# Patient Record
Sex: FEMALE | Race: WHITE | Employment: UNEMPLOYED | URBAN - METROPOLITAN AREA
[De-identification: names, ages, dates, MRNs, and addresses within clinical notes are randomized per-mention and may not be internally consistent; named-entity substitution may affect disease eponyms.]

---

## 2018-02-28 ENCOUNTER — OFFICE VISIT (OUTPATIENT)
Dept: INTERNAL MEDICINE CLINIC | Age: 48
End: 2018-02-28

## 2018-02-28 VITALS
HEIGHT: 65 IN | TEMPERATURE: 99.2 F | DIASTOLIC BLOOD PRESSURE: 83 MMHG | OXYGEN SATURATION: 98 % | RESPIRATION RATE: 18 BRPM | BODY MASS INDEX: 30.16 KG/M2 | SYSTOLIC BLOOD PRESSURE: 110 MMHG | HEART RATE: 96 BPM | WEIGHT: 181 LBS

## 2018-02-28 DIAGNOSIS — Z79.4 TYPE 2 DIABETES MELLITUS WITHOUT COMPLICATION, WITH LONG-TERM CURRENT USE OF INSULIN (HCC): Primary | ICD-10-CM

## 2018-02-28 DIAGNOSIS — E11.9 TYPE 2 DIABETES MELLITUS WITHOUT COMPLICATION, WITH LONG-TERM CURRENT USE OF INSULIN (HCC): Primary | ICD-10-CM

## 2018-02-28 DIAGNOSIS — I47.1 PAROXYSMAL SVT (SUPRAVENTRICULAR TACHYCARDIA) (HCC): ICD-10-CM

## 2018-02-28 DIAGNOSIS — Z92.0 HISTORY OF USE OF CONTRACEPTIVE INTRAUTERINE DEVICE (IUD): ICD-10-CM

## 2018-02-28 DIAGNOSIS — R45.86 MOOD CHANGES: ICD-10-CM

## 2018-02-28 PROBLEM — F19.21 DRUG ADDICTION IN REMISSION (HCC): Status: ACTIVE | Noted: 2018-02-28

## 2018-02-28 RX ORDER — ATORVASTATIN CALCIUM 20 MG/1
TABLET, FILM COATED ORAL
Refills: 3 | COMMUNITY
Start: 2018-02-05

## 2018-02-28 RX ORDER — LOSARTAN POTASSIUM AND HYDROCHLOROTHIAZIDE 12.5; 1 MG/1; MG/1
TABLET ORAL
Refills: 3 | COMMUNITY
Start: 2018-02-05 | End: 2022-01-21 | Stop reason: ALTCHOICE

## 2018-02-28 RX ORDER — METFORMIN HYDROCHLORIDE 500 MG/1
TABLET, EXTENDED RELEASE ORAL
Refills: 3 | COMMUNITY
Start: 2018-02-07

## 2018-02-28 RX ORDER — ARIPIPRAZOLE 15 MG/1
5 TABLET ORAL
Refills: 2 | COMMUNITY
Start: 2018-02-06 | End: 2022-01-21 | Stop reason: ALTCHOICE

## 2018-02-28 RX ORDER — INSULIN HUMAN 100 [IU]/ML
30 INJECTION, SUSPENSION SUBCUTANEOUS
COMMUNITY
Start: 2018-02-12 | End: 2022-01-21 | Stop reason: ALTCHOICE

## 2018-02-28 NOTE — MR AVS SNAPSHOT
[address] [place] [address] [address] [place] [address] 
[phone] Patient: Julito Cedeño MRN: [id number] Visit Information Date & Time Provider Department Dept. Phone Encounter #  
 2/28/2018 10:30 AM Robert Browne MD [facility] [phone] [id number] Upcoming Health Maintenance Date Due Pneumococcal 19-64 Medium Risk (1 of 1 - PPSV23) 6/11/1987 PAP AKA CERVICAL CYTOLOGY 6/11/1989 DTaP/Tdap/Td series (1 - Tdap) 5/9/2011 Influenza Age 5 to Adult 8/1/2017 Allergies as of 2/28/2018  Review Complete On: 2/28/2018 By: Deondre Blanco LPN No Known Allergies Current Immunizations  Never Reviewed Name Date  
 TD Vaccine 5/8/2011  9:07 PM  
  
 Not reviewed this visit You Were Diagnosed With   
  
 Codes Comments Type 2 diabetes mellitus without complication, with long-term current use of insulin (HCC)    -  Primary ICD-10-CM: E11.9, Z79.4 ICD-9-CM: 250.00, V58.67 Mood changes (HCC)     ICD-10-CM: F39 
ICD-9-CM: 296.90 Paroxysmal SVT (supraventricular tachycardia) (HCC)     ICD-10-CM: I47.1 ICD-9-CM: 427.0 History of use of contraceptive intrauterine device (IUD)     ICD-10-CM: Z92.0 ICD-9-CM: V15.7 Vitals BP Pulse Temp Resp Height(growth percentile) Weight(growth percentile) 110/83 96 99.2 °F (37.3 °C) (Oral) 18 5' 5\" (1.651 m) 181 lb (82.1 kg) LMP SpO2 BMI OB Status Smoking Status 02/26/2018 (Exact Date) 98% 30.12 kg/m2 Having regular periods Current Every Day Smoker Vitals History BMI and BSA Data Body Mass Index Body Surface Area  
 30.12 kg/m 2 1.94 m 2 Preferred Pharmacy Pharmacy Name Phone [id number] - [place], [place] - [address] [phone] Your Updated Medication List  
  
   
This list is accurate as of 2/28/18 11:11 AM.  Always use your most recent med list.  
  
  
  
  
 ARIPiprazole 15 mg tablet Commonly known as:  ABILIFY TAKE 1 TABLET BY ORAL ROUTE EVERY DAY  
  
 atorvastatin 20 mg tablet Commonly known as:  LIPITOR  
TAKE 1 TABLET BY ORAL ROUTE EVERY DAY HumuLIN 70/30 U-100 Insulin 100 unit/mL (70-30) injection Generic drug:  insulin NPH/insulin regular 30 Units Before breakfast and dinner. hydrOXYzine HCl 50 mg tablet Commonly known as:  ATARAX Take 50 mg by mouth three (3) times daily as needed. losartan-hydroCHLOROthiazide 100-12.5 mg per tablet Commonly known as:  HYZAAR  
TAKE 1 TABLET BY ORAL ROUTE EVERY DAY  
  
 metFORMIN  mg tablet Commonly known as:  GLUCOPHAGE XR  
TAKE 1 TABLET BY ORAL ROUTE 2 TIMES EVERY DAY WELLBUTRIN 100 mg tablet Generic drug:  buPROPion Take 300 mg by mouth daily. Indications: DEPRESSION We Performed the Following CBC WITH AUTOMATED DIFF [03997 CPT(R)] HEMOGLOBIN A1C W/O EAG [11797 CPT(R)] LIPID PANEL [59534 CPT(R)] METABOLIC PANEL, COMPREHENSIVE [18112 CPT(R)] MICROALBUMIN, UR, RAND W/ MICROALB/CREAT RATIO A3084517 CPT(R)] REFERRAL TO OBSTETRICS AND GYNECOLOGY [REF51 Custom] TSH 3RD GENERATION [75223 CPT(R)] Referral Information Referral ID Referred By Referred To  
  
 [id number] Daniel Hopkins MD   
   [address] [place] [address] Pkwy Phone: [phone] Fax: [phone] Visits Status Start Date End Date 1 New Request 2/28/18 2/28/19 If your referral has a status of pending review or denied, additional information will be sent to support the outcome of this decision. Introducing [facility]! Naheed Holt introduces Memorop patient portal. Now you can access parts of your medical record, email your doctor's office, and request medication refills online. 1. In your internet browser, go to [email or web address] 2. Click on the First Time User? Click Here link in the Sign In box. You will see the New Member Sign Up page. 3. Enter your Systems Integration Access Code exactly as it appears below. You will not need to use this code after youve completed the sign-up process. If you do not sign up before the expiration date, you must request a new code. · Systems Integration Access Code: [email or web address] Expires: 5/29/2018 11:11 AM 
 
4. Enter the last four digits of your Social Security Number (xxxx) and Date of Birth (mm/dd/yyyy) as indicated and click Submit. You will be taken to the next sign-up page. 5. Create a Systems Integration ID. This will be your Systems Integration login ID and cannot be changed, so think of one that is secure and easy to remember. 6. Create a Systems Integration password. You can change your password at any time. 7. Enter your Password Reset Question and Answer. This can be used at a later time if you forget your password. 8. Enter your e-mail address. You will receive e-mail notification when new information is available in [address] 9. Click Sign Up. You can now view and download portions of your medical record. 10. Click the Download Summary menu link to download a portable copy of your medical information. If you have questions, please visit the Frequently Asked Questions section of the Systems Integration website. Remember, Systems Integration is NOT to be used for urgent needs. For medical emergencies, dial 911. Now available from your iPhone and Android! Please provide this summary of care documentation to your next provider. Your primary care clinician is listed as Magen Tripp. If you have any questions after today's visit, please call [phone].

## 2018-02-28 NOTE — PROGRESS NOTES
Chief Complaint   Patient presents with    Establish Care     Hx of Tachycardia, anxiety, jansergiory [address] [place] [address]      Seen [address] [place] [address] no records  History of Present Illness: This is a difficult new patient. She is a 50year-old disabled women who is disabled for psychiatric disorder. She denies bipolar. She says she has had chronic depression, PTSD, panic attack and anxiety. She is a tobacco abuser. She has a history of substance abuse. She was previously addicted to pain medications. She has been addicted to cocaine in the past.  She has been addicted to heroin in the past.  She now uses marijuana. She does not drink alcohol. She is addicted to tobacco.  She is smoking one and a half packs a day. She is now an insulin dependent diabetic. She is being managed by [facility]. She was in the ER at [address] [place] [address] over a month ago with tachycardia. No records are available. They sent her home with an appointment to see cardiology. She still has not seen Dr. Drew Vaughan. She says she now has a referral from the [facility] doctor to see Dr. Drew Vaughan. She has not cut back on her tobacco.  She does drink two big cups of coffee every morning. She has chronic anxiety. She sees the nurse practitioner at [facility] every two weeks. They have got her on Abilify weaning off of Seroquel. She is a true mess. No exercise. She has an IUD that has not been checked in ten years. 4 pregnancies, five kids, the youngest is 8. She has no custody of any of them. Only records are er notes and old psych admission    Physical Examination:   She has poor dentition. Blood pressure was normal.  Chest was clear. She smelled of tobacco.  Her cardiac exam was regular rhythm, no murmur. Discussion/Plan:  I recommended cardiology followup. I told her to reduce her tobacco, reduce her caffeine and they may be causing the tachycardia. I told her to continue to manage her diabetes with [facility] as she is doing.   We could do it here, but she can walk to the [facility] [place]. She needs a ride to come here. I did not really make any other big recommendations other than she follow up with [facility] for psychiatric care. Follow up with [facility] for diabetes. Noted that her Abilify will make her diabetes worse. Substance abuse counseling was advised. Getting into programs to help with her substance abuse would be helpful. SUBJECTIVE: Emperatriz Contreras is a 50 y.o. female seen for a follow up visit; she has diabetes, hypertension and hyperlipidemia. Current Outpatient Prescriptions   Medication Sig Dispense Refill    atorvastatin (LIPITOR) 20 mg tablet TAKE 1 TABLET BY ORAL ROUTE EVERY DAY  3    losartan-hydroCHLOROthiazide (HYZAAR) 100-12.5 mg per tablet TAKE 1 TABLET BY ORAL ROUTE EVERY DAY  3    metFORMIN ER (GLUCOPHAGE XR) 500 mg tablet TAKE 1 TABLET BY ORAL ROUTE 2 TIMES EVERY DAY  3    HUMULIN 70/30 U-100 INSULIN 100 unit/mL (70-30) injection 30 Units Before breakfast and dinner.  ARIPiprazole (ABILIFY) 15 mg tablet TAKE 1 TABLET BY ORAL ROUTE EVERY DAY  2    buPROPion (WELLBUTRIN) 100 mg tablet Take 300 mg by mouth daily. Indications: DEPRESSION      hydrOXYzine (ATARAX) 50 mg tablet Take 50 mg by mouth three (3) times daily as needed. Patient Active Problem List   Diagnosis Code    Mood changes ([facility] F39    Type 2 diabetes mellitus without complication, with long-term current use of insulin ([facility] E11.9, Z79.4    Drug addiction in remission ([facility] F19.21    Alcoholism /alcohol abuse ([facility] F10.20     System Review: Cardiovascular ROS - taking medications as instructed, no medication side effects noted, patient does not perform home BP monitoring, no TIA's, no chest pain on exertion, no dyspnea on exertion, no swelling of ankles, palpitations described as racing. New concerns: .      OBJECTIVE:  Visit Vitals    /83    Pulse 96    Temp 99.2 °F (37.3 °C) (Oral)    Resp 18    Ht 5' 5\" (1.651 m)    Wt 181 lb (82.1 kg)    LMP 02/26/2018 (Exact Date)    SpO2 98%    BMI 30.12 kg/m2      Appearance: alert, well appearing, and in no distress and overweight. General exam: CVS exam BP noted to be well controlled today in office, S1, S2 normal, no gallop, no murmur, chest clear, no JVD, no HSM, no edema. Lab review: orders written for new lab studies as appropriate; see orders. ASSESSMENT:  diabetes poorly controlled, hypertension stable, hyperlipidemia control uncertain. PLAN:  current treatment plan is effective, no change in therapy  lab results and schedule of future lab studies reviewed with patient  recommended sodium restriction  very strongly urged to quit smoking to reduce cardiovascular risk  reviewed medications and side effects in detail  reviewed potential future medication changes and side effects  use of aspirin to prevent MI and TIA's discussed. Diagnoses and all orders for this visit:    1. Type 2 diabetes mellitus without complication, with long-term current use of insulin (HCC)  -     CBC WITH AUTOMATED DIFF  -     LIPID PANEL  -     METABOLIC PANEL, COMPREHENSIVE  -     HEMOGLOBIN A1C W/O EAG  -     MICROALBUMIN, UR, RAND W/ MICROALB/CREAT RATIO    2. Mood changes (HCC)    3. Paroxysmal SVT (supraventricular tachycardia) ([facility])  -     TSH 3RD GENERATION    4.  History of use of contraceptive intrauterine device (IUD)  -     [facility] ref [facility]

## 2019-12-30 LAB — EF %, EXTERNAL: NORMAL

## 2021-04-07 LAB — LDL-C, EXTERNAL: 76

## 2021-04-28 ENCOUNTER — HOSPITAL ENCOUNTER (EMERGENCY)
Age: 51
Discharge: HOME | End: 2021-04-28
Attending: EMERGENCY MEDICINE
Payer: MEDICARE

## 2021-04-28 VITALS
OXYGEN SATURATION: 100 % | HEART RATE: 85 BPM | BODY MASS INDEX: 27.66 KG/M2 | DIASTOLIC BLOOD PRESSURE: 72 MMHG | TEMPERATURE: 97.6 F | WEIGHT: 166 LBS | SYSTOLIC BLOOD PRESSURE: 123 MMHG | HEIGHT: 65 IN | RESPIRATION RATE: 21 BRPM

## 2021-04-28 DIAGNOSIS — E87.6 HYPOKALEMIA: ICD-10-CM

## 2021-04-28 DIAGNOSIS — F10.10 ALCOHOL ABUSE: Primary | ICD-10-CM

## 2021-04-28 DIAGNOSIS — R11.2 NON-INTRACTABLE VOMITING WITH NAUSEA, UNSPECIFIED VOMITING TYPE: ICD-10-CM

## 2021-04-28 LAB
ANION GAP SERPL CALC-SCNC: 9 MMOL/L (ref 5–15)
BASOPHILS # BLD: 0 K/UL (ref 0–0.1)
BASOPHILS NFR BLD: 1 % (ref 0–1)
BUN SERPL-MCNC: 8 MG/DL (ref 6–20)
BUN/CREAT SERPL: 7 (ref 12–20)
CALCIUM SERPL-MCNC: 9.2 MG/DL (ref 8.5–10.1)
CHLORIDE SERPL-SCNC: 111 MMOL/L (ref 97–108)
CO2 SERPL-SCNC: 20 MMOL/L (ref 21–32)
COMMENT, HOLDF: NORMAL
CREAT SERPL-MCNC: 1.12 MG/DL (ref 0.55–1.02)
DIFFERENTIAL METHOD BLD: NORMAL
EOSINOPHIL # BLD: 0 K/UL (ref 0–0.4)
EOSINOPHIL NFR BLD: 1 % (ref 0–7)
ERYTHROCYTE [DISTWIDTH] IN BLOOD BY AUTOMATED COUNT: 12.3 % (ref 11.5–14.5)
GLUCOSE SERPL-MCNC: 215 MG/DL (ref 65–100)
HCT VFR BLD AUTO: 43.4 % (ref 35–47)
HGB BLD-MCNC: 15.3 G/DL (ref 11.5–16)
IMM GRANULOCYTES # BLD AUTO: 0 K/UL (ref 0–0.04)
IMM GRANULOCYTES NFR BLD AUTO: 0 % (ref 0–0.5)
LIPASE SERPL-CCNC: 90 U/L (ref 73–393)
LYMPHOCYTES # BLD: 2.3 K/UL (ref 0.8–3.5)
LYMPHOCYTES NFR BLD: 39 % (ref 12–49)
MCH RBC QN AUTO: 33.3 PG (ref 26–34)
MCHC RBC AUTO-ENTMCNC: 35.3 G/DL (ref 30–36.5)
MCV RBC AUTO: 94.3 FL (ref 80–99)
MONOCYTES # BLD: 0.5 K/UL (ref 0–1)
MONOCYTES NFR BLD: 9 % (ref 5–13)
NEUTS SEG # BLD: 3.1 K/UL (ref 1.8–8)
NEUTS SEG NFR BLD: 50 % (ref 32–75)
NRBC # BLD: 0 K/UL (ref 0–0.01)
NRBC BLD-RTO: 0 PER 100 WBC
PLATELET # BLD AUTO: 239 K/UL (ref 150–400)
PMV BLD AUTO: 9.8 FL (ref 8.9–12.9)
POTASSIUM SERPL-SCNC: 3.4 MMOL/L (ref 3.5–5.1)
RBC # BLD AUTO: 4.6 M/UL (ref 3.8–5.2)
SAMPLES BEING HELD,HOLD: NORMAL
SODIUM SERPL-SCNC: 140 MMOL/L (ref 136–145)
WBC # BLD AUTO: 6 K/UL (ref 3.6–11)

## 2021-04-28 PROCEDURE — 83690 ASSAY OF LIPASE: CPT

## 2021-04-28 PROCEDURE — 74011250636 HC RX REV CODE- 250/636: Performed by: EMERGENCY MEDICINE

## 2021-04-28 PROCEDURE — 80048 BASIC METABOLIC PNL TOTAL CA: CPT

## 2021-04-28 PROCEDURE — 99285 EMERGENCY DEPT VISIT HI MDM: CPT

## 2021-04-28 PROCEDURE — 85025 COMPLETE CBC W/AUTO DIFF WBC: CPT

## 2021-04-28 PROCEDURE — 96376 TX/PRO/DX INJ SAME DRUG ADON: CPT

## 2021-04-28 PROCEDURE — 96361 HYDRATE IV INFUSION ADD-ON: CPT

## 2021-04-28 PROCEDURE — 96374 THER/PROPH/DIAG INJ IV PUSH: CPT

## 2021-04-28 PROCEDURE — 36415 COLL VENOUS BLD VENIPUNCTURE: CPT

## 2021-04-28 RX ORDER — ONDANSETRON 2 MG/ML
4 INJECTION INTRAMUSCULAR; INTRAVENOUS ONCE
Status: COMPLETED | OUTPATIENT
Start: 2021-04-28 | End: 2021-04-28

## 2021-04-28 RX ORDER — POTASSIUM CHLORIDE 750 MG/1
40 TABLET, FILM COATED, EXTENDED RELEASE ORAL 2 TIMES DAILY
Qty: 8 TAB | Refills: 0 | Status: SHIPPED | OUTPATIENT
Start: 2021-04-28 | End: 2021-04-29

## 2021-04-28 RX ORDER — ONDANSETRON 2 MG/ML
4 INJECTION INTRAMUSCULAR; INTRAVENOUS
Status: COMPLETED | OUTPATIENT
Start: 2021-04-28 | End: 2021-04-28

## 2021-04-28 RX ORDER — ONDANSETRON 4 MG/1
4 TABLET, ORALLY DISINTEGRATING ORAL
Qty: 12 TAB | Refills: 0 | Status: SHIPPED | OUTPATIENT
Start: 2021-04-28 | End: 2022-01-21 | Stop reason: ALTCHOICE

## 2021-04-28 RX ADMIN — ONDANSETRON 4 MG: 2 INJECTION INTRAMUSCULAR; INTRAVENOUS at 13:05

## 2021-04-28 RX ADMIN — ONDANSETRON 4 MG: 2 INJECTION INTRAMUSCULAR; INTRAVENOUS at 14:03

## 2021-04-28 RX ADMIN — SODIUM CHLORIDE 1000 ML: 9 INJECTION, SOLUTION INTRAVENOUS at 13:04

## 2021-04-28 NOTE — ED TRIAGE NOTES
Patient arrives via EMS for nausea, patient went on a espinosa last night and drank about a liter of rum. Patient currently dry heaving and diphoretic. Reports smoking some mariajuana last night as well. Patient reports before last night her last drink was Saturday and she does not know how much she drank the.  per EMS.

## 2021-04-28 NOTE — ED PROVIDER NOTES
Date of Service:  2021    Patient:  Ewa Talbot    Chief Complaint:  Nausea and Alcohol Problem       HPI:  Ewa Talbot is a 44 y.o.  female who presents for evaluation of nausea and vomiting. Patient notes that she is a heavy user of alcohol. She drank a pint of rum last evening. She has generalized abdominal discomfort vomiting and dry heaving. She states that she feels \"terrible. \"  She states that she feels like she needs to vomit but notes that she has been vomiting all morning. Otherwise no other acute complaints. Patient's last EtOH intake before last night was over the weekend. She states that she has been in inpatient rehab for alcohol abuse in the past           Past Medical History:   Diagnosis Date    Depression        Past Surgical History:   Procedure Laterality Date    DELIVERY            No family history on file.     Social History     Socioeconomic History    Marital status: SINGLE     Spouse name: Not on file    Number of children: Not on file    Years of education: Not on file    Highest education level: Not on file   Occupational History    Not on file   Social Needs    Financial resource strain: Not on file    Food insecurity     Worry: Not on file     Inability: Not on file    Transportation needs     Medical: Not on file     Non-medical: Not on file   Tobacco Use    Smoking status: Current Every Day Smoker     Packs/day: 1.50   Substance and Sexual Activity    Alcohol use: No    Drug use: Not on file    Sexual activity: Never   Lifestyle    Physical activity     Days per week: Not on file     Minutes per session: Not on file    Stress: Not on file   Relationships    Social connections     Talks on phone: Not on file     Gets together: Not on file     Attends [employer] service: Not on file     Active member of club or organization: Not on file     Attends meetings of clubs or organizations: Not on file     Relationship status: Not on file    Intimate partner violence     Fear of current or ex partner: Not on file     Emotionally abused: Not on file     Physically abused: Not on file     Forced sexual activity: Not on file   Other Topics Concern    Not on file   Social History Narrative    Not on file         ALLERGIES: Patient has no known allergies. Review of Systems   Constitutional: Negative for fever. HENT: Negative for hearing loss. Eyes: Negative for visual disturbance. Respiratory: Negative for shortness of breath. Cardiovascular: Negative for chest pain. Gastrointestinal: Positive for abdominal pain, nausea and vomiting. Genitourinary: Negative for flank pain. Musculoskeletal: Negative for back pain. Skin: Negative for rash. Neurological: Negative for dizziness and light-headedness. Psychiatric/Behavioral: Negative for confusion. Vitals:    04/28/21 1252   BP: 139/79   Pulse: 92   Resp: 25   SpO2: 97%   Weight: 75.3 kg (166 lb)   Height: 5' 5\" (1.651 m)            Physical Exam  Vitals signs and nursing note reviewed. Constitutional:       General: She is in acute distress. Appearance: Normal appearance. She is not ill-appearing, toxic-appearing or diaphoretic. HENT:      Head: Normocephalic and atraumatic. Mouth/Throat:      Mouth: Mucous membranes are moist.   Eyes:      General: No scleral icterus. Pupils: Pupils are equal, round, and reactive to light. Cardiovascular:      Rate and Rhythm: Normal rate. Pulses: Normal pulses. Pulmonary:      Effort: No respiratory distress. Abdominal:      General: Abdomen is flat. Tenderness: There is abdominal tenderness (general). Musculoskeletal:      Right lower leg: No edema. Left lower leg: No edema. Skin:     General: Skin is warm. Capillary Refill: Capillary refill takes less than 2 seconds. Coloration: Skin is not jaundiced. Neurological:      Mental Status: She is alert and oriented to person, place, and time. Psychiatric:         Mood and Affect: Mood normal.          MDM     VITAL SIGNS:  No data found. LABS:  No results found for this or any previous visit (from the past 6 hour(s)). IMAGING:  No orders to display         Medications During Visit:  Medications   sodium chloride 0.9 % bolus infusion 1,000 mL (0 mL IntraVENous IV Completed 4/28/21 1524)   ondansetron (ZOFRAN) injection 4 mg (4 mg IntraVENous Given 4/28/21 1305)   ondansetron (ZOFRAN) injection 4 mg (4 mg IntraVENous Given 4/28/21 1403)         DECISION MAKING:  Ayse Guadalupe is a 44 y.o. female who comes in as above. Here, patient has some retching when she arrives but no vomiting. She feels better after the medicines provided. I will discharge the patient home with Zofran and resources for alcohol abuse. Patient agrees to follow-up with PCP and return as needed. Patient remained stable at disposition      IMPRESSION:  1. Alcohol abuse    2. Non-intractable vomiting with nausea, unspecified vomiting type    3. Hypokalemia        DISPOSITION:  Discharged      Discharge Medication List as of 4/28/2021  3:07 PM      START taking these medications    Details   potassium chloride SR (Klor-Con 10) 10 mEq tablet Take 4 Tabs by mouth two (2) times a day for 2 doses. , Normal, Disp-8 Tab, R-0      ondansetron (ZOFRAN ODT) 4 mg disintegrating tablet Take 1 Tab by mouth every eight (8) hours as needed for Nausea for up to 12 doses. , Normal, Disp-12 Tab, R-0         CONTINUE these medications which have NOT CHANGED    Details   atorvastatin (LIPITOR) 20 mg tablet TAKE 1 TABLET BY ORAL ROUTE EVERY DAY, Historical Med, R-3      losartan-hydroCHLOROthiazide (HYZAAR) 100-12.5 mg per tablet TAKE 1 TABLET BY ORAL ROUTE EVERY DAY, Historical Med, R-3      metFORMIN ER (GLUCOPHAGE XR) 500 mg tablet TAKE 1 TABLET BY ORAL ROUTE 2 TIMES EVERY DAY, Historical Med, R-3      HUMULIN 70/30 U-100 INSULIN 100 unit/mL (70-30) injection 30 Units Before breakfast and dinner., Historical Med, JASS      ARIPiprazole (ABILIFY) 15 mg tablet TAKE 1 TABLET BY ORAL ROUTE EVERY DAY, Historical Med, R-2      buPROPion (WELLBUTRIN) 100 mg tablet Take 300 mg by mouth daily. Indications: DEPRESSIONHistorical Med, 300 mg      hydrOXYzine (ATARAX) 50 mg tablet Take 50 mg by mouth three (3) times daily as needed. Historical Med, 50 mg              Follow-up Information     Follow up With Specialties Details Why Contact Info    Dorota Andrea MD Internal Medicine Schedule an appointment as soon as possible for a visit   [address] [facility]  [phone]      [facility] EMERGENCY DEPT Emergency Medicine Go to  As needed, If symptoms worsen [address] [facility]  [phone]            The patient is asked to follow-up with their primary care provider in the next several days. They are to call tomorrow for an appointment. The patient is asked to return promptly for any increased concerns or worsening of symptoms. They can return to this emergency department or any other emergency department.     Procedures

## 2021-05-20 ENCOUNTER — PATIENT OUTREACH (OUTPATIENT)
Dept: CASE MANAGEMENT | Age: 51
End: 2021-05-20

## 2021-05-20 NOTE — LETTER
Bakari Darshana [address] [place] [address],[address] [place] [id number] My name is Donis cervantes. I am a Care Manager with [facility]. I often work with patients who could benefit from additional support understanding and managing their health. We are committed to providing you excellent care. I have been unable to reach you on this at [phone] and [phone]. Please contact me at [phone] if you would like additional help with community resources. We appreciate the confidence you've shown by selecting us to provide your healthcare needs and I look forward to hearing from you soon. Sincerely, Salazar MTZN, RN  Ambulatory Care Manager

## 2021-05-25 NOTE — PROGRESS NOTES
05/25/21 [facility] has been unable to contact patient by phone after multiple attempts to enroll in CCM. Get In Touch letter will be sent to address on file on 5/26/21. Will allow 14 days for return contact and resolve encounter at that time if unable to reach patient.  ULISES

## 2021-06-08 ENCOUNTER — PATIENT OUTREACH (OUTPATIENT)
Dept: CASE MANAGEMENT | Age: 51
End: 2021-06-08

## 2021-07-30 ENCOUNTER — TRANSCRIBE ORDER (OUTPATIENT)
Dept: MAMMOGRAPHY | Age: 51
End: 2021-07-30

## 2021-07-30 ENCOUNTER — HOSPITAL ENCOUNTER (OUTPATIENT)
Dept: MAMMOGRAPHY | Age: 51
Discharge: HOME | End: 2021-07-30
Attending: INTERNAL MEDICINE
Payer: MEDICARE

## 2021-07-30 DIAGNOSIS — Z12.31 VISIT FOR SCREENING MAMMOGRAM: Primary | ICD-10-CM

## 2021-07-30 DIAGNOSIS — Z12.31 VISIT FOR SCREENING MAMMOGRAM: ICD-10-CM

## 2021-07-30 PROCEDURE — 77067 SCR MAMMO BI INCL CAD: CPT

## 2022-01-21 ENCOUNTER — VIRTUAL VISIT (OUTPATIENT)
Dept: INTERNAL MEDICINE CLINIC | Age: 52
End: 2022-01-21
Payer: MEDICARE

## 2022-01-21 DIAGNOSIS — B35.4 TINEA CORPORIS: Primary | ICD-10-CM

## 2022-01-21 PROBLEM — I47.1 SUPRAVENTRICULAR TACHYCARDIA (HCC): Status: ACTIVE | Noted: 2022-01-21

## 2022-01-21 PROBLEM — I10 HYPERTENSIVE DISORDER: Status: ACTIVE | Noted: 2022-01-21

## 2022-01-21 PROCEDURE — G8427 DOCREV CUR MEDS BY ELIG CLIN: HCPCS | Performed by: PHYSICIAN ASSISTANT

## 2022-01-21 PROCEDURE — G8432 DEP SCR NOT DOC, RNG: HCPCS | Performed by: PHYSICIAN ASSISTANT

## 2022-01-21 PROCEDURE — G8756 NO BP MEASURE DOC: HCPCS | Performed by: PHYSICIAN ASSISTANT

## 2022-01-21 PROCEDURE — 3017F COLORECTAL CA SCREEN DOC REV: CPT | Performed by: PHYSICIAN ASSISTANT

## 2022-01-21 PROCEDURE — G9899 SCRN MAM PERF RSLTS DOC: HCPCS | Performed by: PHYSICIAN ASSISTANT

## 2022-01-21 PROCEDURE — 99213 OFFICE O/P EST LOW 20 MIN: CPT | Performed by: PHYSICIAN ASSISTANT

## 2022-01-21 PROCEDURE — G8419 CALC BMI OUT NRM PARAM NOF/U: HCPCS | Performed by: PHYSICIAN ASSISTANT

## 2022-01-21 RX ORDER — LOSARTAN POTASSIUM 50 MG/1
1 TABLET ORAL DAILY
COMMUNITY
Start: 2021-11-03

## 2022-01-21 RX ORDER — ALBUTEROL SULFATE 90 UG/1
2 AEROSOL, METERED RESPIRATORY (INHALATION)
COMMUNITY

## 2022-01-21 RX ORDER — TERBINAFINE HYDROCHLORIDE 250 MG/1
250 TABLET ORAL DAILY
Qty: 7 TABLET | Refills: 0 | Status: SHIPPED | OUTPATIENT
Start: 2022-01-21

## 2022-01-21 RX ORDER — ARIPIPRAZOLE 5 MG/1
1 TABLET ORAL DAILY
COMMUNITY
Start: 2021-12-24

## 2022-01-21 NOTE — PROGRESS NOTES
1. Have you been to the ER, urgent care clinic since your last visit? Hospitalized since your last visit? No    2. Have you seen or consulted any other health care providers outside of the [address] [place] [address] since your last visit? Include any pap smears or colon screening. No    Health Maintenance Due   Topic Date Due    Hepatitis C Screening  Never done    COVID-19 Vaccine (1) Never done    Pneumococcal 0-64 years (1 of 2 - PPSV23) Never done    Foot Exam Q1  Never done    A1C test (Diabetic or Prediabetic)  Never done    MICROALBUMIN Q1  Never done    Eye Exam Retinal or Dilated  Never done    Cervical cancer screen  Never done    DTaP/Tdap/Td series (1 - Tdap) 05/09/2011    Colorectal Cancer Screening Combo  Never done    Shingrix Vaccine Age 50> (1 of 2) Never done    Low dose CT lung screening  Never done    Medicare Yearly Exam  Never done    Flu Vaccine (1) 09/01/2021     Patient has a yeast infection on stomach x2 months, spreads, red, blister that popped with fluid, itchy, pain.

## 2022-01-21 NOTE — PROGRESS NOTES
Divya Paz is a 46 y.o. female who was seen by synchronous (real-time) audio-video technology on 1/21/2022 for Skin Problem        Assessment & Plan:   Diagnoses and all orders for this visit:    1. Tinea corporis  -     terbinafine HCL (LAMISIL) 250 mg tablet; Take 1 Tablet by mouth daily. I explained to the patient I would like for her to start an oral medication. I have asked her to come into the office if her symptoms does not improve with treatment. I spent at least 20 minutes on this visit with this established patient. 712  Subjective:   Patient presents today for concerns of yeast infection. Reports that she started with vaginal yeast infection symptoms about 2 months ago. She reports now for the past 2 to 3 days she has been having some itching under the folds of her abdomen as well as her groin area. The patient states that she believes she has a yeast infection there as well. Prior to Admission medications    Medication Sig Start Date End Date Taking? Authorizing Provider   ARIPiprazole (ABILIFY) 5 mg tablet Take 1 Tablet by mouth daily. 12/24/21  Yes Provider, Historical   losartan (COZAAR) 50 mg tablet Take 1 Tablet by mouth daily. 11/3/21  Yes Provider, Historical   albuterol (PROVENTIL HFA, VENTOLIN HFA, PROAIR HFA) 90 mcg/actuation inhaler Take 2 Puffs by inhalation every six (6) hours as needed for Wheezing. Yes Provider, Historical   terbinafine HCL (LAMISIL) 250 mg tablet Take 1 Tablet by mouth daily. 1/21/22  Yes Libby Sandoval PA-C   fluvoxaMINE (LUVOX) 100 mg tablet Take 100 mg by mouth every evening. Yes Provider, Historical   aspirin delayed-release 81 mg tablet Take 81 mg by mouth daily.    Yes Provider, Historical   atorvastatin (LIPITOR) 20 mg tablet TAKE 1 TABLET BY ORAL ROUTE EVERY DAY 2/5/18  Yes Provider, Historical   metFORMIN ER (GLUCOPHAGE XR) 500 mg tablet TAKE 1 TABLET BY ORAL ROUTE 2 TIMES EVERY DAY 2/7/18  Yes Provider, Historical   ondansetron (ZOFRAN ODT) 4 mg disintegrating tablet Take 1 Tab by mouth every eight (8) hours as needed for Nausea for up to 12 doses. 4/28/21 1/21/22  Junie Calderón DO   losartan-hydroCHLOROthiazide [facility]) 100-12.5 mg per tablet TAKE 1 TABLET BY ORAL ROUTE EVERY DAY 2/5/18 1/21/22  Provider, Historical   HUMULIN 70/30 U-100 INSULIN 100 unit/mL (70-30) injection 30 Units Before breakfast and dinner. Patient not taking: Reported on 7/15/2021 2/12/18 1/21/22  Provider, Historical   ARIPiprazole (ABILIFY) 15 mg tablet 5 mg. 2/6/18 1/21/22  Provider, Historical   buPROPion (WELLBUTRIN) 100 mg tablet Take 300 mg by mouth daily. Indications: DEPRESSION  Patient not taking: Reported on 7/15/2021 5/8/11 1/21/22  Other, MD Florin   hydrOXYzine (ATARAX) 50 mg tablet Take 50 mg by mouth three (3) times daily as needed. 1/21/22  Other, MD Florin     No Known Allergies    ROS  See above  Objective:   No flowsheet data found. General: alert, cooperative, no distress   Mental  status: normal mood, behavior, speech, dress, motor activity, and thought processes, able to follow commands   HENT: NCAT   Neck: no visualized mass   Resp: no respiratory distress   Neuro: no gross deficits   Skin: Erythema noted under the folds of abdomen as well in groin area per patient. She reports it is very itchy   Psychiatric: normal affect, consistent with stated mood, no evidence of hallucinations     Additional exam findings: We discussed the expected course, resolution and complications of the diagnosis(es) in detail. Medication risks, benefits, costs, interactions, and alternatives were discussed as indicated. I advised her to contact the office if her condition worsens, changes or fails to improve as anticipated. She expressed understanding with the diagnosis(es) and plan. Tapan Tobar, was evaluated through a synchronous (real-time) audio-video encounter.  The patient (or guardian if applicable) is aware that this is a billable service, which includes applicable co-pays. Verbal consent to proceed has been obtained. The visit was conducted pursuant to the emergency declaration under the [address] [place] [address] and the [employer] General Act. Patient identification was verified, and a caregiver was present when appropriate. The patient was located at home in a state where the provider was licensed to provide care.     Kimmy Sandoval PA-C

## 2022-03-18 PROBLEM — R45.86 MOOD CHANGES: Status: ACTIVE | Noted: 2018-02-28

## 2022-03-18 PROBLEM — E11.9 TYPE 2 DIABETES MELLITUS WITHOUT COMPLICATION, WITH LONG-TERM CURRENT USE OF INSULIN (HCC): Status: ACTIVE | Noted: 2018-02-28

## 2022-03-18 PROBLEM — Z79.4 TYPE 2 DIABETES MELLITUS WITHOUT COMPLICATION, WITH LONG-TERM CURRENT USE OF INSULIN (HCC): Status: ACTIVE | Noted: 2018-02-28

## 2022-03-18 PROBLEM — F19.21 DRUG ADDICTION IN REMISSION (HCC): Status: ACTIVE | Noted: 2018-02-28

## 2022-03-19 PROBLEM — I10 HYPERTENSIVE DISORDER: Status: ACTIVE | Noted: 2022-01-21

## 2022-03-19 PROBLEM — I47.1 SUPRAVENTRICULAR TACHYCARDIA (HCC): Status: ACTIVE | Noted: 2022-01-21

## 2022-03-19 PROBLEM — I47.10 SUPRAVENTRICULAR TACHYCARDIA: Status: ACTIVE | Noted: 2022-01-21

## 2022-06-15 LAB — HBA1C MFR BLD HPLC: 7.3 %

## 2022-10-18 LAB — MAMMOGRAPHY, EXTERNAL: NORMAL

## 2022-10-24 ENCOUNTER — TELEPHONE (OUTPATIENT)
Dept: PHARMACY | Age: 52
End: 2022-10-24

## 2022-10-24 NOTE — LETTER
[facility] [address]  [address], [address]        [address] [address]           10/24/22     Dear Rolando Servin,    This letter is in regard to your Atorvastatin 20mg. If you are no longer taking or taking differently, please call us at [phone] Option 2, so that we can discuss this and update your medication profile. It appears that this medication has not been filled at proper times. We are worried you might be missing doses or not taking it as directed. It is important that you take your medications regularly and try not to miss a single dose.     Some ways to help you remember to take and refill your medications are to:  · Use a pill box, set an alarm, and/or keep your medication near something that you do every day  · Fill a 3-month supply of your prescription at a time to save you time and trips to the pharmacy - if you would like assistance in switching your prescriptions to a 3-month supply, please contact us [phone]    · Ask your pharmacy if they participate in [facility]", a program where you can  all of your medications on the same day  · Ask your pharmacy if you can be set up with automatic refill, where they will  automatically refill your prescription when it is due and let you know it's ready to     Sincerely,   Hebert Garland, PharmD, [facility] [facility], toll free: [phone] Option 2

## 2022-10-24 NOTE — TELEPHONE ENCOUNTER
[facility]More Adherence Patient:    Do Not contact list? NO  Jimena Patient has been sent a  Letter in regard to adherence for Atorvastatin 20mg. If Jimena Patient calls back in reference to intervention, and would like refills/change in medication/ or help gaining a 90 day supply.      Please route the message to [address] [place] [address] Avenue       ================================================================================  For Pharmacy Admin Tracking Only    CPA in place: No  Time Spent (min): 5   ()

## 2022-11-11 ENCOUNTER — NURSE TRIAGE (OUTPATIENT)
Dept: OTHER | Facility: CLINIC | Age: 52
End: 2022-11-11

## 2022-11-11 ENCOUNTER — TELEPHONE (OUTPATIENT)
Dept: INTERNAL MEDICINE CLINIC | Age: 52
End: 2022-11-11

## 2022-11-11 NOTE — TELEPHONE ENCOUNTER
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 Subjective: Caller states \"I have L neck pain\"     Current Symptoms: L elbow pain, making it hard to pick things up. Neck pain is worsening. Chronic bladder control issues for several years that is getting worse. Has deteriorating discs in her neck and was told would need surgery    Onset: 3 months ago; worsening    Associated Symptoms: reduced activity    Pain Severity: 7/10; pulled muscle; constant, moderate    Temperature: denies fever     What has been tried: Ibuprofen    LMP: NA Pregnant: NA    Recommended disposition: Go to ED/UCC Now (Or to Office with PCP Approval)    Care advice provided, patient verbalizes understanding; denies any other questions or concerns; instructed to call back for any new or worsening symptoms. Writer provided warm transfer to [facility] at [facility] Medicine for second level triage. Attention Provider: Thank you for allowing me to participate in the care of your patient. The patient was connected to triage in response to information provided to the [facility]. Please do not respond through this encounter as the response is not directed to a shared pool.     Reason for Disposition   Problems with bowel or bladder control    Protocols used: Neck Pain or Stiffness-ADULT-[place]

## 2022-11-11 NOTE — TELEPHONE ENCOUNTER
Spoke with patient to triage c/o neck pain x3 months, left elbow pain and bladder control issues for years. Patient has not been seen since 2018, patient of Dr. Silverio Merritt. Patient has been advised to seek medical attention for the neck pain and address other issues as well. Patient to follow up with Dr. Silverio Merritt, re establish care. Patient verbalized understanding.

## 2023-06-26 ENCOUNTER — OFFICE VISIT (OUTPATIENT)
Age: 53
End: 2023-06-26
Payer: MEDICARE

## 2023-06-26 VITALS
TEMPERATURE: 98.3 F | RESPIRATION RATE: 20 BRPM | WEIGHT: 206.2 LBS | HEART RATE: 79 BPM | OXYGEN SATURATION: 96 % | HEIGHT: 65 IN | SYSTOLIC BLOOD PRESSURE: 124 MMHG | BODY MASS INDEX: 34.35 KG/M2 | DIASTOLIC BLOOD PRESSURE: 69 MMHG

## 2023-06-26 DIAGNOSIS — L98.9 SKIN LESION: ICD-10-CM

## 2023-06-26 DIAGNOSIS — Z79.4 TYPE 2 DIABETES MELLITUS WITHOUT COMPLICATION, WITH LONG-TERM CURRENT USE OF INSULIN (HCC): ICD-10-CM

## 2023-06-26 DIAGNOSIS — E11.9 TYPE 2 DIABETES MELLITUS WITHOUT COMPLICATION, WITH LONG-TERM CURRENT USE OF INSULIN (HCC): ICD-10-CM

## 2023-06-26 DIAGNOSIS — K13.0 LIP ULCER: Primary | ICD-10-CM

## 2023-06-26 DIAGNOSIS — F17.200 SMOKING: ICD-10-CM

## 2023-06-26 PROBLEM — F10.20 ALCOHOLISM (HCC): Status: ACTIVE | Noted: 2018-02-28

## 2023-06-26 PROCEDURE — 3074F SYST BP LT 130 MM HG: CPT | Performed by: INTERNAL MEDICINE

## 2023-06-26 PROCEDURE — 99213 OFFICE O/P EST LOW 20 MIN: CPT | Performed by: INTERNAL MEDICINE

## 2023-06-26 PROCEDURE — 3078F DIAST BP <80 MM HG: CPT | Performed by: INTERNAL MEDICINE

## 2023-06-26 RX ORDER — LOSARTAN POTASSIUM 100 MG/1
100 TABLET ORAL DAILY
COMMUNITY
Start: 2023-05-16

## 2023-06-26 RX ORDER — INSULIN HUMAN 100 [IU]/ML
INJECTION, SUSPENSION SUBCUTANEOUS
COMMUNITY
Start: 2023-05-22

## 2023-06-26 RX ORDER — NALTREXONE HYDROCHLORIDE 50 MG/1
50 TABLET, FILM COATED ORAL EVERY MORNING
COMMUNITY
Start: 2023-04-28

## 2023-06-26 RX ORDER — PEN NEEDLE, DIABETIC 32GX 5/32"
NEEDLE, DISPOSABLE MISCELLANEOUS
COMMUNITY
Start: 2023-05-30

## 2023-06-26 RX ORDER — DICLOFENAC SODIUM 75 MG/1
75 TABLET, DELAYED RELEASE ORAL 2 TIMES DAILY
COMMUNITY
Start: 2023-05-16

## 2023-06-26 RX ORDER — ARIPIPRAZOLE 10 MG/1
10 TABLET ORAL DAILY
COMMUNITY
Start: 2023-06-17

## 2023-06-26 RX ORDER — FAMOTIDINE 20 MG/1
20 TABLET, FILM COATED ORAL 2 TIMES DAILY
COMMUNITY
Start: 2023-03-29

## 2023-06-26 SDOH — ECONOMIC STABILITY: HOUSING INSECURITY
IN THE LAST 12 MONTHS, WAS THERE A TIME WHEN YOU DID NOT HAVE A STEADY PLACE TO SLEEP OR SLEPT IN A SHELTER (INCLUDING NOW)?: NO

## 2023-06-26 SDOH — ECONOMIC STABILITY: FOOD INSECURITY: WITHIN THE PAST 12 MONTHS, YOU WORRIED THAT YOUR FOOD WOULD RUN OUT BEFORE YOU GOT MONEY TO BUY MORE.: OFTEN TRUE

## 2023-06-26 SDOH — ECONOMIC STABILITY: FOOD INSECURITY: WITHIN THE PAST 12 MONTHS, THE FOOD YOU BOUGHT JUST DIDN'T LAST AND YOU DIDN'T HAVE MONEY TO GET MORE.: OFTEN TRUE

## 2023-06-26 SDOH — ECONOMIC STABILITY: INCOME INSECURITY: HOW HARD IS IT FOR YOU TO PAY FOR THE VERY BASICS LIKE FOOD, HOUSING, MEDICAL CARE, AND HEATING?: VERY HARD

## 2023-06-26 ASSESSMENT — ANXIETY QUESTIONNAIRES
4. TROUBLE RELAXING: 1
3. WORRYING TOO MUCH ABOUT DIFFERENT THINGS: 1
5. BEING SO RESTLESS THAT IT IS HARD TO SIT STILL: 1
7. FEELING AFRAID AS IF SOMETHING AWFUL MIGHT HAPPEN: 3
GAD7 TOTAL SCORE: 13
2. NOT BEING ABLE TO STOP OR CONTROL WORRYING: 3
1. FEELING NERVOUS, ANXIOUS, OR ON EDGE: 3
IF YOU CHECKED OFF ANY PROBLEMS ON THIS QUESTIONNAIRE, HOW DIFFICULT HAVE THESE PROBLEMS MADE IT FOR YOU TO DO YOUR WORK, TAKE CARE OF THINGS AT HOME, OR GET ALONG WITH OTHER PEOPLE: SOMEWHAT DIFFICULT
6. BECOMING EASILY ANNOYED OR IRRITABLE: 1

## 2023-06-26 ASSESSMENT — PATIENT HEALTH QUESTIONNAIRE - PHQ9
SUM OF ALL RESPONSES TO PHQ QUESTIONS 1-9: 0
1. LITTLE INTEREST OR PLEASURE IN DOING THINGS: 0
SUM OF ALL RESPONSES TO PHQ QUESTIONS 1-9: 0
SUM OF ALL RESPONSES TO PHQ9 QUESTIONS 1 & 2: 0
2. FEELING DOWN, DEPRESSED OR HOPELESS: 0
SUM OF ALL RESPONSES TO PHQ QUESTIONS 1-9: 0
SUM OF ALL RESPONSES TO PHQ QUESTIONS 1-9: 0

## 2023-06-26 ASSESSMENT — ENCOUNTER SYMPTOMS
SHORTNESS OF BREATH: 0
COUGH: 0

## 2023-08-15 LAB
ALBUMIN SERPL-MCNC: 4.5 G/DL (ref 3.8–4.9)
ALBUMIN/GLOB SERPL: 1.5 {RATIO} (ref 1.2–2.2)
ALP SERPL-CCNC: 70 IU/L (ref 44–121)
ALT SERPL-CCNC: 23 IU/L (ref 0–32)
AST SERPL-CCNC: 26 IU/L (ref 0–40)
BILIRUB SERPL-MCNC: <0.2 MG/DL (ref 0–1.2)
BUN SERPL-MCNC: 5 MG/DL (ref 6–24)
BUN/CREAT SERPL: 6 (ref 9–23)
CALCIUM SERPL-MCNC: 9.6 MG/DL (ref 8.7–10.2)
CHLORIDE SERPL-SCNC: 102 MMOL/L (ref 96–106)
CHOLEST SERPL-MCNC: 168 MG/DL (ref 100–199)
CO2 SERPL-SCNC: 23 MMOL/L (ref 20–29)
CREAT SERPL-MCNC: 0.84 MG/DL (ref 0.57–1)
EGFRCR SERPLBLD CKD-EPI 2021: 82 ML/MIN/1.73
GLOBULIN SER CALC-MCNC: 3.1 G/DL (ref 1.5–4.5)
GLUCOSE SERPL-MCNC: 206 MG/DL (ref 70–99)
HBA1C MFR BLD: 6.7 % (ref 4.8–5.6)
HDLC SERPL-MCNC: 41 MG/DL
IMP & REVIEW OF LAB RESULTS: NORMAL
LDLC SERPL CALC-MCNC: 100 MG/DL (ref 0–99)
POTASSIUM SERPL-SCNC: 4.1 MMOL/L (ref 3.5–5.2)
PROT SERPL-MCNC: 7.6 G/DL (ref 6–8.5)
SODIUM SERPL-SCNC: 147 MMOL/L (ref 134–144)
TRIGL SERPL-MCNC: 156 MG/DL (ref 0–149)
VLDLC SERPL CALC-MCNC: 27 MG/DL (ref 5–40)

## 2023-11-28 ENCOUNTER — COMMUNITY OUTREACH (OUTPATIENT)
Age: 53
End: 2023-11-28

## 2024-08-19 ENCOUNTER — TELEPHONE (OUTPATIENT)
Age: 54
End: 2024-08-19

## 2024-08-20 ENCOUNTER — TELEPHONE (OUTPATIENT)
Age: 54
End: 2024-08-20

## 2024-08-20 NOTE — TELEPHONE ENCOUNTER
Left vm for patient regarding appointment request to establish care with Dr. Valdez. Informed patient that Dr. Valdez is currently not accepting new patients. Let her know that she can establish with Dr. Brown or Dr. Draper. Left number for patient to return call to schedule an appointment.

## 2025-02-25 ENCOUNTER — TELEPHONE (OUTPATIENT)
Age: 55
End: 2025-02-25

## 2025-02-25 RX ORDER — LOSARTAN POTASSIUM 100 MG/1
100 TABLET ORAL DAILY
Qty: 90 TABLET | Refills: 0 | Status: SHIPPED | OUTPATIENT
Start: 2025-02-25

## 2025-02-25 RX ORDER — ATORVASTATIN CALCIUM 20 MG/1
20 TABLET, FILM COATED ORAL DAILY
Qty: 90 TABLET | Refills: 0 | Status: SHIPPED | OUTPATIENT
Start: 2025-02-25

## 2025-02-25 NOTE — TELEPHONE ENCOUNTER
Pt is requesting a refill for   atorvastatin (LIPITOR) 20 MG tablet [[id number]] and for   losartan (COZAAR) 100 MG tablet [[id number]] to last her until her appointment on 3/4/2025 she is currently all out of her medication.

## 2025-02-26 NOTE — TELEPHONE ENCOUNTER
Requested Prescriptions     Signed Prescriptions Disp Refills    losartan (COZAAR) 100 MG tablet 90 tablet 0     Sig: Take 1 tablet by mouth daily     Authorizing Provider: MILTON DEVINE    atorvastatin (LIPITOR) 20 MG tablet 90 tablet 0     Sig: Take 1 tablet by mouth daily     Authorizing Provider: MILTON DEVINE

## 2025-03-03 SDOH — ECONOMIC STABILITY: INCOME INSECURITY: IN THE LAST 12 MONTHS, WAS THERE A TIME WHEN YOU WERE NOT ABLE TO PAY THE MORTGAGE OR RENT ON TIME?: YES

## 2025-03-03 SDOH — ECONOMIC STABILITY: FOOD INSECURITY: WITHIN THE PAST 12 MONTHS, THE FOOD YOU BOUGHT JUST DIDN'T LAST AND YOU DIDN'T HAVE MONEY TO GET MORE.: OFTEN TRUE

## 2025-03-03 SDOH — ECONOMIC STABILITY: TRANSPORTATION INSECURITY
IN THE PAST 12 MONTHS, HAS LACK OF TRANSPORTATION KEPT YOU FROM MEETINGS, WORK, OR FROM GETTING THINGS NEEDED FOR DAILY LIVING?: YES

## 2025-03-03 SDOH — ECONOMIC STABILITY: FOOD INSECURITY: WITHIN THE PAST 12 MONTHS, YOU WORRIED THAT YOUR FOOD WOULD RUN OUT BEFORE YOU GOT MONEY TO BUY MORE.: OFTEN TRUE

## 2025-03-03 SDOH — ECONOMIC STABILITY: TRANSPORTATION INSECURITY
IN THE PAST 12 MONTHS, HAS THE LACK OF TRANSPORTATION KEPT YOU FROM MEDICAL APPOINTMENTS OR FROM GETTING MEDICATIONS?: YES

## 2025-03-04 ENCOUNTER — OFFICE VISIT (OUTPATIENT)
Age: 55
End: 2025-03-04
Payer: MEDICARE

## 2025-03-04 VITALS
HEART RATE: 61 BPM | SYSTOLIC BLOOD PRESSURE: 160 MMHG | DIASTOLIC BLOOD PRESSURE: 97 MMHG | RESPIRATION RATE: 18 BRPM | WEIGHT: 190.2 LBS | BODY MASS INDEX: 31.69 KG/M2 | OXYGEN SATURATION: 93 % | TEMPERATURE: 97.2 F | HEIGHT: 65 IN

## 2025-03-04 DIAGNOSIS — I10 ACCELERATED ESSENTIAL HYPERTENSION: ICD-10-CM

## 2025-03-04 DIAGNOSIS — E11.9 TYPE 2 DIABETES MELLITUS WITHOUT COMPLICATION, WITH LONG-TERM CURRENT USE OF INSULIN (HCC): Primary | ICD-10-CM

## 2025-03-04 DIAGNOSIS — E11.9 TYPE 2 DIABETES MELLITUS WITHOUT COMPLICATION, WITH LONG-TERM CURRENT USE OF INSULIN (HCC): ICD-10-CM

## 2025-03-04 DIAGNOSIS — F19.21 DRUG ADDICTION IN REMISSION (HCC): ICD-10-CM

## 2025-03-04 DIAGNOSIS — Z87.891 PERSONAL HISTORY OF TOBACCO USE: ICD-10-CM

## 2025-03-04 DIAGNOSIS — Z72.0 TOBACCO ABUSE: ICD-10-CM

## 2025-03-04 DIAGNOSIS — E78.5 DYSLIPIDEMIA: ICD-10-CM

## 2025-03-04 DIAGNOSIS — Z79.4 TYPE 2 DIABETES MELLITUS WITHOUT COMPLICATION, WITH LONG-TERM CURRENT USE OF INSULIN (HCC): Primary | ICD-10-CM

## 2025-03-04 DIAGNOSIS — Z79.4 TYPE 2 DIABETES MELLITUS WITHOUT COMPLICATION, WITH LONG-TERM CURRENT USE OF INSULIN (HCC): ICD-10-CM

## 2025-03-04 PROCEDURE — 99214 OFFICE O/P EST MOD 30 MIN: CPT | Performed by: INTERNAL MEDICINE

## 2025-03-04 PROCEDURE — G0296 VISIT TO DETERM LDCT ELIG: HCPCS | Performed by: INTERNAL MEDICINE

## 2025-03-04 RX ORDER — INSULIN HUMAN 100 [IU]/ML
INJECTION, SUSPENSION SUBCUTANEOUS
Qty: 5 ADJUSTABLE DOSE PRE-FILLED PEN SYRINGE | Refills: 5 | Status: SHIPPED | OUTPATIENT
Start: 2025-03-04

## 2025-03-04 RX ORDER — LOSARTAN POTASSIUM AND HYDROCHLOROTHIAZIDE 25; 100 MG/1; MG/1
1 TABLET ORAL DAILY
Qty: 90 TABLET | Refills: 1 | Status: SHIPPED | OUTPATIENT
Start: 2025-03-04

## 2025-03-04 ASSESSMENT — PATIENT HEALTH QUESTIONNAIRE - PHQ9
2. FEELING DOWN, DEPRESSED OR HOPELESS: SEVERAL DAYS
1. LITTLE INTEREST OR PLEASURE IN DOING THINGS: NOT AT ALL
SUM OF ALL RESPONSES TO PHQ QUESTIONS 1-9: 1

## 2025-03-04 NOTE — PATIENT INSTRUCTIONS
[employer] ([facility])  What they offer:  Mammograms and PAP smears regardless of ability to pay.  Contact: [phone]    [facility] Health Financial Assistance  What they offer: [facility] provides financial assistance to patients based on their income, assets, and needs. Assistance may also be provided in obtaining free or low-cost health insurance or arranging a manageable payment plan.  Website: [email or web address]/locations/[email or web address]-medical-[email or web address]/billing-[email or web address]-insurance/financial-assistance  Assistance application can be downloaded from above website  Financial Counseling Call Center: [phone]  Medications  [facility]  What they offer: [employer] tracks prescription drug prices and provides free drug coupons for discounts on medications.  Website: https://www.[employer]/  [employer]  What they offer: [employer] offers free information on medications and healthcare cost savings programs including prescription assistance programs, coupons, and discount programs.  Website: https://www.[employer].org/  Helpline: 800.737.3297    [employer] Assist  What they offer: Information about free and low-cost medicine programs.  Website: https://www.rxassist.org/  [employer] $4 Prescription Program  What they offer: Prescription Program includes up to a 30-day supply for $4 and a 90-day supply for $10 of some covered generic drugs at commonly prescribed dosages  Website: https://www.RIGID/cp/4-prescriptions/6650622      Financial Opportunity Centers:  [employer]   What they offer: free coaching services in the areas of Employment, Financial, and Benefits Access   Phone Number: [phone]   Address: [address] [place] [address]   Website: https://www.[employer].org/economic-resource-center/rqghdzxwq-jqqdbwtjqdr-yfftpm-[place]/       [facility]   What they offer: free coaching services in the areas of Employment, Financial, and Benefits Access   Phone Number: [phone]   Email: info@[facility].org   Address: [address]

## 2025-03-05 ENCOUNTER — TELEPHONE (OUTPATIENT)
Age: 55
End: 2025-03-05

## 2025-03-05 LAB
ALBUMIN SERPL-MCNC: 3.6 G/DL (ref 3.5–5)
ALBUMIN/GLOB SERPL: 0.9 (ref 1.1–2.2)
ALP SERPL-CCNC: 64 U/L (ref 45–117)
ALT SERPL-CCNC: 17 U/L (ref 12–78)
ANION GAP SERPL CALC-SCNC: 6 MMOL/L (ref 2–12)
AST SERPL-CCNC: 13 U/L (ref 15–37)
BASOPHILS # BLD: 0.03 K/UL (ref 0–0.1)
BASOPHILS NFR BLD: 0.5 % (ref 0–1)
BILIRUB SERPL-MCNC: 0.2 MG/DL (ref 0.2–1)
BUN SERPL-MCNC: 18 MG/DL (ref 6–20)
BUN/CREAT SERPL: 17 (ref 12–20)
CALCIUM SERPL-MCNC: 9.7 MG/DL (ref 8.5–10.1)
CHLORIDE SERPL-SCNC: 107 MMOL/L (ref 97–108)
CHOLEST SERPL-MCNC: 165 MG/DL
CO2 SERPL-SCNC: 26 MMOL/L (ref 21–32)
CREAT SERPL-MCNC: 1.04 MG/DL (ref 0.55–1.02)
CREAT UR-MCNC: 59.3 MG/DL
DIFFERENTIAL METHOD BLD: NORMAL
EOSINOPHIL # BLD: 0.1 K/UL (ref 0–0.4)
EOSINOPHIL NFR BLD: 1.5 % (ref 0–7)
ERYTHROCYTE [DISTWIDTH] IN BLOOD BY AUTOMATED COUNT: 13.8 % (ref 11.5–14.5)
EST. AVERAGE GLUCOSE BLD GHB EST-MCNC: 148 MG/DL
GLOBULIN SER CALC-MCNC: 3.9 G/DL (ref 2–4)
GLUCOSE SERPL-MCNC: 130 MG/DL (ref 65–100)
HBA1C MFR BLD: 6.8 % (ref 4–5.6)
HCT VFR BLD AUTO: 36.2 % (ref 35–47)
HDLC SERPL-MCNC: 46 MG/DL
HDLC SERPL: 3.6 (ref 0–5)
HGB BLD-MCNC: 12.3 G/DL (ref 11.5–16)
IMM GRANULOCYTES # BLD AUTO: 0.02 K/UL (ref 0–0.04)
IMM GRANULOCYTES NFR BLD AUTO: 0.3 % (ref 0–0.5)
LDLC SERPL CALC-MCNC: 92.2 MG/DL (ref 0–100)
LYMPHOCYTES # BLD: 2.44 K/UL (ref 0.8–3.5)
LYMPHOCYTES NFR BLD: 37.5 % (ref 12–49)
MCH RBC QN AUTO: 32.3 PG (ref 26–34)
MCHC RBC AUTO-ENTMCNC: 34 G/DL (ref 30–36.5)
MCV RBC AUTO: 95 FL (ref 80–99)
MICROALBUMIN UR-MCNC: 2.53 MG/DL
MICROALBUMIN/CREAT UR-RTO: 43 MG/G (ref 0–30)
MONOCYTES # BLD: 0.55 K/UL (ref 0–1)
MONOCYTES NFR BLD: 8.4 % (ref 5–13)
NEUTS SEG # BLD: 3.37 K/UL (ref 1.8–8)
NEUTS SEG NFR BLD: 51.8 % (ref 32–75)
NRBC # BLD: 0 K/UL (ref 0–0.01)
NRBC BLD-RTO: 0 PER 100 WBC
PLATELET # BLD AUTO: 215 K/UL (ref 150–400)
PMV BLD AUTO: 11.1 FL (ref 8.9–12.9)
POTASSIUM SERPL-SCNC: 4.6 MMOL/L (ref 3.5–5.1)
PROT SERPL-MCNC: 7.5 G/DL (ref 6.4–8.2)
RBC # BLD AUTO: 3.81 M/UL (ref 3.8–5.2)
SODIUM SERPL-SCNC: 139 MMOL/L (ref 136–145)
SPECIMEN HOLD: NORMAL
TRIGL SERPL-MCNC: 134 MG/DL
VLDLC SERPL CALC-MCNC: 26.8 MG/DL
WBC # BLD AUTO: 6.5 K/UL (ref 3.6–11)

## 2025-03-05 NOTE — TELEPHONE ENCOUNTER
----- Message from Dr. Luigi Valdez MD sent at 3/5/2025 11:03 AM EST -----  Please call this patient let her know that her diabetes control is pretty good with her A1c being less than 7% let her also know that the only other abnormality was that there was some protein in the urine protein in the urine as a complication of diabetes it is just mild and we need to follow it and control the diabetes she is taking losartan and losartan helps protect the kidneys

## 2025-03-07 ENCOUNTER — TELEPHONE (OUTPATIENT)
Age: 55
End: 2025-03-07

## 2025-03-07 DIAGNOSIS — I10 ESSENTIAL HYPERTENSION: Primary | ICD-10-CM

## 2025-03-07 RX ORDER — AMLODIPINE BESYLATE 5 MG/1
5 TABLET ORAL DAILY
Qty: 90 TABLET | Refills: 0 | Status: SHIPPED | OUTPATIENT
Start: 2025-03-07

## 2025-03-07 NOTE — TELEPHONE ENCOUNTER
Staff called patient but did not get an answer. Staff will attempt to call patient again to discuss A1c and protein in urine.       KHAI Olsen

## 2025-03-07 NOTE — TELEPHONE ENCOUNTER
Called and spoke with pt.  Verified identity x2.    Relayed Dr Valdez's message\"..her diabetes control is pretty good with her A1c being less than 7% let her also know that the only other abnormality was that there was some protein in the urine protein in the urine as a complication of diabetes it is just mild and we need to follow it and control the diabetes she is taking losartan and losartan helps protect the kidneys\"    Pt states she just started the losartan today  Pt advised to let this office know if her BP readings do not improve.    Pt verbalized understanding.

## 2025-03-07 NOTE — PROGRESS NOTES
Documentation:  I communicated with the patient and/or health care decision maker about elevated blood pressure.   Details of this discussion including any medical advice provided: Her blood pressure using home cuff was 170s/90s despite taking her losartan-HCTZ 100-25 this morning. She denies CP, SOB or headache. She admits to drinking heavily for the last two days and inquires if that could have contributed to her elevated blood pressure. I informed her it could. I also asked her to watch her sodium intake especially if she eats pre-packaged foods and to limit sodium to <2grams per day. Given her high BP she is OK with adding another agent. She has not been on amlodipine in the past so we will start her on amlodipine 5mg. I asked her to purchase a blood pressure cuff to check her blood pressure at home at various times. She was using her cousin's blood pressure cuff earlier. She is establishing with Dr. Brown in May but I asked her to see him soone rif her blood pressure remain elevated. She understood and agreed with the plan. All questions answered.     Total Time: minutes: 11-20 minutes    Claudette Small was evaluated through a synchronous (real-time) audio encounter. Patient identification was verified at the start of the visit. She (or guardian if applicable) is aware that this is a billable service, which includes applicable co-pays. This visit was conducted with the patient's (and/or legal guardian's) verbal consent. She has not had a related appointment within my department in the past 7 days or scheduled within the next 24 hours.   The patient was located at Home: [address] [place] [address].  The provider was located at Facility (Appt Dept): [address] [place] [phone].  Confirm you are appropriately licensed, registered, or certified to deliver care in the state where the patient is located as indicated above. If you are not or unsure, please re-schedule

## 2025-03-07 NOTE — TELEPHONE ENCOUNTER
Pt called to let the office know her BP readings was 171/93 after getting a call from the nurses about her lab results.

## 2025-03-11 NOTE — TELEPHONE ENCOUNTER
Verified pt identifiers x 2  Spoke with pt and she has been notified verbatim , Pt states her B/p was normal at hospital yesterday was asked to monitor for about a week and send readings via My Chart .     the pt did notify us she had been in the hospital and has a Kidney infection she is currently being treated for .

## 2025-03-16 ENCOUNTER — HOSPITAL ENCOUNTER (OUTPATIENT)
Facility: HOSPITAL | Age: 55
Discharge: HOME | End: 2025-03-19
Attending: INTERNAL MEDICINE
Payer: MEDICARE

## 2025-03-16 DIAGNOSIS — Z87.891 PERSONAL HISTORY OF TOBACCO USE: ICD-10-CM

## 2025-03-16 PROCEDURE — 71271 CT THORAX LUNG CANCER SCR C-: CPT

## 2025-04-01 ENCOUNTER — TELEPHONE (OUTPATIENT)
Age: 55
End: 2025-04-01

## 2025-04-01 NOTE — TELEPHONE ENCOUNTER
Sherlyn from [employer] calling to find out whether the forms for the patient's continuous glucose monitor has been received by the provider. She states the forms were faxed to [facility] on 03/13/25. I let her know that I would send a message back to the clinical staff and request someone return her call with an update. She understood. [employer] can be contacted at [phone].

## 2025-04-01 NOTE — TELEPHONE ENCOUNTER
Verified pt identifiers x 2  and notified verbatim form has not been received , [employer] will refax the form , please send back by 4/3/2025 with office notes and recent A1c

## 2025-05-29 RX ORDER — ATORVASTATIN CALCIUM 20 MG/1
20 TABLET, FILM COATED ORAL DAILY
Qty: 90 TABLET | Refills: 0 | Status: SHIPPED | OUTPATIENT
Start: 2025-05-29

## 2025-05-29 NOTE — TELEPHONE ENCOUNTER
Refill request received from [facility] for   Requested Prescriptions     Pending Prescriptions Disp Refills    atorvastatin (LIPITOR) 20 MG tablet 90 tablet 0     Sig: Take 1 tablet by mouth daily     Last office visit: 3/4/2025   Next office visit: 6/4/2025     Routed to Dr Jamaal Brown for review.         Ofe Pandey Cma

## 2025-06-02 ENCOUNTER — TELEPHONE (OUTPATIENT)
Age: 55
End: 2025-06-02

## 2025-06-02 NOTE — TELEPHONE ENCOUNTER
Reached out to patient to confirm if she was establishing care on 6/4/25 with Dr. HARLEY Mailbox was full I was unable to leave a vm.

## 2025-06-03 DIAGNOSIS — I10 ESSENTIAL HYPERTENSION: ICD-10-CM

## 2025-06-03 RX ORDER — AMLODIPINE BESYLATE 5 MG/1
5 TABLET ORAL DAILY
Qty: 90 TABLET | Refills: 0 | Status: SHIPPED | OUTPATIENT
Start: 2025-06-03

## 2025-06-04 ENCOUNTER — OFFICE VISIT (OUTPATIENT)
Age: 55
End: 2025-06-04
Payer: MEDICARE

## 2025-06-04 VITALS
WEIGHT: 180 LBS | OXYGEN SATURATION: 95 % | HEART RATE: 72 BPM | RESPIRATION RATE: 16 BRPM | BODY MASS INDEX: 31.89 KG/M2 | TEMPERATURE: 97.4 F | HEIGHT: 63 IN | SYSTOLIC BLOOD PRESSURE: 109 MMHG | DIASTOLIC BLOOD PRESSURE: 68 MMHG

## 2025-06-04 DIAGNOSIS — F17.210 CIGARETTE NICOTINE DEPENDENCE WITHOUT COMPLICATION: ICD-10-CM

## 2025-06-04 DIAGNOSIS — Z11.4 SCREENING FOR HIV WITHOUT PRESENCE OF RISK FACTORS: ICD-10-CM

## 2025-06-04 DIAGNOSIS — Z12.31 ENCOUNTER FOR SCREENING MAMMOGRAM FOR BREAST CANCER: ICD-10-CM

## 2025-06-04 DIAGNOSIS — Z00.00 INITIAL MEDICARE ANNUAL WELLNESS VISIT: Primary | ICD-10-CM

## 2025-06-04 DIAGNOSIS — I25.10 CORONARY ARTERY CALCIFICATION: ICD-10-CM

## 2025-06-04 DIAGNOSIS — Z11.59 NEED FOR HEPATITIS C SCREENING TEST: ICD-10-CM

## 2025-06-04 DIAGNOSIS — Z79.4 TYPE 2 DIABETES MELLITUS WITHOUT COMPLICATION, WITH LONG-TERM CURRENT USE OF INSULIN (HCC): ICD-10-CM

## 2025-06-04 DIAGNOSIS — I10 ESSENTIAL HYPERTENSION: ICD-10-CM

## 2025-06-04 DIAGNOSIS — E11.9 TYPE 2 DIABETES MELLITUS WITHOUT COMPLICATION, WITH LONG-TERM CURRENT USE OF INSULIN (HCC): ICD-10-CM

## 2025-06-04 PROCEDURE — 99214 OFFICE O/P EST MOD 30 MIN: CPT | Performed by: INTERNAL MEDICINE

## 2025-06-04 PROCEDURE — 3074F SYST BP LT 130 MM HG: CPT | Performed by: INTERNAL MEDICINE

## 2025-06-04 PROCEDURE — 99406 BEHAV CHNG SMOKING 3-10 MIN: CPT | Performed by: INTERNAL MEDICINE

## 2025-06-04 PROCEDURE — 3078F DIAST BP <80 MM HG: CPT | Performed by: INTERNAL MEDICINE

## 2025-06-04 PROCEDURE — G0438 PPPS, INITIAL VISIT: HCPCS | Performed by: INTERNAL MEDICINE

## 2025-06-04 PROCEDURE — 3044F HG A1C LEVEL LT 7.0%: CPT | Performed by: INTERNAL MEDICINE

## 2025-06-04 PROCEDURE — G2211 COMPLEX E/M VISIT ADD ON: HCPCS | Performed by: INTERNAL MEDICINE

## 2025-06-04 RX ORDER — VARENICLINE TARTRATE 0.5 MG/1
.5-1 TABLET, FILM COATED ORAL SEE ADMIN INSTRUCTIONS
Qty: 57 TABLET | Refills: 0 | Status: SHIPPED | OUTPATIENT
Start: 2025-06-04

## 2025-06-04 SDOH — HEALTH STABILITY: PHYSICAL HEALTH: ON AVERAGE, HOW MANY DAYS PER WEEK DO YOU ENGAGE IN MODERATE TO STRENUOUS EXERCISE (LIKE A BRISK WALK)?: 2 DAYS

## 2025-06-04 SDOH — HEALTH STABILITY: PHYSICAL HEALTH: ON AVERAGE, HOW MANY MINUTES DO YOU ENGAGE IN EXERCISE AT THIS LEVEL?: 40 MIN

## 2025-06-04 ASSESSMENT — PATIENT HEALTH QUESTIONNAIRE - PHQ9
7. TROUBLE CONCENTRATING ON THINGS, SUCH AS READING THE NEWSPAPER OR WATCHING TELEVISION: SEVERAL DAYS
8. MOVING OR SPEAKING SO SLOWLY THAT OTHER PEOPLE COULD HAVE NOTICED. OR THE OPPOSITE, BEING SO FIGETY OR RESTLESS THAT YOU HAVE BEEN MOVING AROUND A LOT MORE THAN USUAL: NOT AT ALL
6. FEELING BAD ABOUT YOURSELF - OR THAT YOU ARE A FAILURE OR HAVE LET YOURSELF OR YOUR FAMILY DOWN: SEVERAL DAYS
3. TROUBLE FALLING OR STAYING ASLEEP: MORE THAN HALF THE DAYS
SUM OF ALL RESPONSES TO PHQ QUESTIONS 1-9: 13
9. THOUGHTS THAT YOU WOULD BE BETTER OFF DEAD, OR OF HURTING YOURSELF: NOT AT ALL
5. POOR APPETITE OR OVEREATING: NOT AT ALL
2. FEELING DOWN, DEPRESSED OR HOPELESS: NEARLY EVERY DAY
4. FEELING TIRED OR HAVING LITTLE ENERGY: NEARLY EVERY DAY
1. LITTLE INTEREST OR PLEASURE IN DOING THINGS: NEARLY EVERY DAY
SUM OF ALL RESPONSES TO PHQ QUESTIONS 1-9: 13
10. IF YOU CHECKED OFF ANY PROBLEMS, HOW DIFFICULT HAVE THESE PROBLEMS MADE IT FOR YOU TO DO YOUR WORK, TAKE CARE OF THINGS AT HOME, OR GET ALONG WITH OTHER PEOPLE: VERY DIFFICULT
SUM OF ALL RESPONSES TO PHQ QUESTIONS 1-9: 13
SUM OF ALL RESPONSES TO PHQ QUESTIONS 1-9: 13

## 2025-06-04 ASSESSMENT — LIFESTYLE VARIABLES
HOW MANY STANDARD DRINKS CONTAINING ALCOHOL DO YOU HAVE ON A TYPICAL DAY: PATIENT DOES NOT DRINK
HOW OFTEN DO YOU HAVE A DRINK CONTAINING ALCOHOL: NEVER

## 2025-06-04 NOTE — PROGRESS NOTES
I have reviewed all needed documentation in preparation for visit. Verified patient by name and date of birth  Chief Complaint   Patient presents with    Medicare AWV    Establish Care       There were no vitals filed for this visit.    Health Maintenance Due   Topic Date Due    Diabetic foot exam  Never done    HIV screen  Never done    Diabetic retinal exam  Never done    Hepatitis C screen  Never done    Hepatitis B vaccine (1 of 3 - 19+ 3-dose series) Never done    Pneumococcal 50+ years Vaccine (1 of 2 - PCV) Never done    Cervical cancer screen  Never done    DTaP/Tdap/Td vaccine (1 - Tdap) 05/09/2011    Colorectal Cancer Screen  Never done    Shingles vaccine (1 of 2) Never done    COVID-19 Vaccine (1 - 2024-25 season) Never done    Breast cancer screen  10/12/2024    Annual Wellness Visit (Medicare Advantage)  Never done     \"Have you been to the ER, urgent care clinic since your last visit?  Hospitalized since your last visit?\"    NO    “Have you seen or consulted any other health care providers outside of [facility] since your last visit?”    NO    Have you had a mammogram?”   NO    Date of last Mammogram: 10/12/2022      “Have you had a pap smear?”    NO    No cervical cancer screening on file         “Have you had a colorectal cancer screening such as a colonoscopy/FIT/Cologuard?    NO    No colonoscopy on file  No cologuard on file  No FIT/FOBT on file   No flexible sigmoidoscopy on file         Click Here for Release of Records Request         Ofe DE ANDA

## 2025-06-04 NOTE — PROGRESS NOTES
Medicare Annual Wellness Visit    Claudette Small is here for Medicare AWV, Results (Lung cancer screening results .), and Edema (Pt states since starting Amlodipine she has bilateral swelling in ankles .)    Assessment & Plan   Initial Medicare annual wellness visit  Essential hypertension  -     Comprehensive Metabolic Panel; Future  Type 2 diabetes mellitus without complication, with long-term current use of insulin (HCC)  -     Hemoglobin A1C; Future  -     Lipid Panel; Future  -     Albumin/Creatinine Ratio, Urine; Future  -     Comprehensive Metabolic Panel; Future  -     Ambulatory referral to Ophthalmology  Cigarette nicotine dependence without complication  -     varenicline (CHANTIX) 0.5 MG tablet; Take 1-2 tablets by mouth See Admin Instructions 0.5mg DAILY for 3 days followed by 0.5mg TWICE DAILY for 4 days followed by 1mg TWICE DAILY, Disp-57 tablet, R-0Normal  Need for hepatitis C screening test  -     Hepatitis C Antibody; Future  Screening for HIV without presence of risk factors  -     HIV 1/2 Ag/Ab, 4TH Generation,W Rflx Confirm; Future  Encounter for screening mammogram for breast cancer  -     [facility] MARIZOL DIGITAL SCREEN BILATERAL [[id number]]; Future  Coronary artery calcification  -     Ambulatory referral to Cardiology     No follow-ups on file.     Subjective   The following acute and/or chronic problems were also addressed today:    Ms. Small is a 54-year-old female with a history of hypertension, hyperlipidemia, type 2 diabetes mellitus (on insulin), obesity, nicotine dependence, alcohol use disorder, and bipolar depression presents for her annual wellness visit and follow-up of chronic medical conditions.    DM:  The patient reports that her blood sugars are generally stable. She is compliant with her medications, which include insulin 30 units in the morning and 18 units at night, along with metformin. She denies any medication-related side effects. Microalbuminuria was noted on her last lab,

## 2025-06-10 LAB
ALBUMIN SERPL-MCNC: 4.2 G/DL (ref 3.8–4.9)
ALBUMIN/CREAT UR: 18 MG/G CREAT (ref 0–29)
ALP SERPL-CCNC: 60 IU/L (ref 44–121)
ALT SERPL-CCNC: 8 IU/L (ref 0–32)
AST SERPL-CCNC: 14 IU/L (ref 0–40)
BILIRUB SERPL-MCNC: <0.2 MG/DL (ref 0–1.2)
BUN SERPL-MCNC: 25 MG/DL (ref 6–24)
BUN/CREAT SERPL: 19 (ref 9–23)
CALCIUM SERPL-MCNC: 9.4 MG/DL (ref 8.7–10.2)
CHLORIDE SERPL-SCNC: 99 MMOL/L (ref 96–106)
CHOLEST SERPL-MCNC: 135 MG/DL (ref 100–199)
CO2 SERPL-SCNC: 24 MMOL/L (ref 20–29)
CREAT SERPL-MCNC: 1.34 MG/DL (ref 0.57–1)
CREAT UR-MCNC: 113.1 MG/DL
EGFRCR SERPLBLD CKD-EPI 2021: 47 ML/MIN/1.73
GLOBULIN SER CALC-MCNC: 2.8 G/DL (ref 1.5–4.5)
GLUCOSE SERPL-MCNC: 127 MG/DL (ref 70–99)
HBA1C MFR BLD: 6.5 % (ref 4.8–5.6)
HCV IGG SERPL QL IA: NON REACTIVE
HDLC SERPL-MCNC: 46 MG/DL
HIV 1+2 AB+HIV1 P24 AG SERPL QL IA: NON REACTIVE
IMP & REVIEW OF LAB RESULTS: NORMAL
LDLC SERPL CALC-MCNC: 70 MG/DL (ref 0–99)
Lab: NORMAL
MICROALBUMIN UR-MCNC: 20.6 UG/ML
POTASSIUM SERPL-SCNC: 4.2 MMOL/L (ref 3.5–5.2)
PROT SERPL-MCNC: 7 G/DL (ref 6–8.5)
REPORT: NORMAL
REPORT: NORMAL
SODIUM SERPL-SCNC: 138 MMOL/L (ref 134–144)
TRIGL SERPL-MCNC: 102 MG/DL (ref 0–149)
VLDLC SERPL CALC-MCNC: 19 MG/DL (ref 5–40)

## 2025-06-11 ENCOUNTER — RESULTS FOLLOW-UP (OUTPATIENT)
Age: 55
End: 2025-06-11

## 2025-07-05 DIAGNOSIS — F17.210 CIGARETTE NICOTINE DEPENDENCE WITHOUT COMPLICATION: ICD-10-CM

## 2025-07-07 RX ORDER — VARENICLINE TARTRATE 0.5 MG/1
.5-1 TABLET, FILM COATED ORAL SEE ADMIN INSTRUCTIONS
Qty: 171 TABLET | Refills: 1 | Status: SHIPPED | OUTPATIENT
Start: 2025-07-07

## 2025-08-04 ENCOUNTER — OFFICE VISIT (OUTPATIENT)
Age: 55
End: 2025-08-04
Payer: MEDICARE

## 2025-08-04 VITALS
DIASTOLIC BLOOD PRESSURE: 60 MMHG | BODY MASS INDEX: 28.99 KG/M2 | WEIGHT: 174 LBS | OXYGEN SATURATION: 90 % | HEART RATE: 82 BPM | SYSTOLIC BLOOD PRESSURE: 100 MMHG | HEIGHT: 65 IN

## 2025-08-04 DIAGNOSIS — R07.9 CHEST PAIN, UNSPECIFIED TYPE: ICD-10-CM

## 2025-08-04 DIAGNOSIS — R06.02 SOB (SHORTNESS OF BREATH): ICD-10-CM

## 2025-08-04 DIAGNOSIS — I47.10 SVT (SUPRAVENTRICULAR TACHYCARDIA): Primary | ICD-10-CM

## 2025-08-04 PROCEDURE — 99204 OFFICE O/P NEW MOD 45 MIN: CPT | Performed by: INTERNAL MEDICINE

## 2025-08-04 PROCEDURE — 3074F SYST BP LT 130 MM HG: CPT | Performed by: INTERNAL MEDICINE

## 2025-08-04 PROCEDURE — 93005 ELECTROCARDIOGRAM TRACING: CPT | Performed by: INTERNAL MEDICINE

## 2025-08-04 PROCEDURE — 93010 ELECTROCARDIOGRAM REPORT: CPT | Performed by: INTERNAL MEDICINE

## 2025-08-04 PROCEDURE — 3078F DIAST BP <80 MM HG: CPT | Performed by: INTERNAL MEDICINE

## 2025-08-04 RX ORDER — ATENOLOL 25 MG/1
TABLET ORAL
Qty: 3 TABLET | Refills: 0 | Status: SHIPPED | OUTPATIENT
Start: 2025-08-04

## 2025-08-08 RX ORDER — LOSARTAN POTASSIUM AND HYDROCHLOROTHIAZIDE 25; 100 MG/1; MG/1
1 TABLET ORAL DAILY
Qty: 90 TABLET | Refills: 1 | Status: SHIPPED | OUTPATIENT
Start: 2025-08-08

## 2025-08-22 DIAGNOSIS — I10 ESSENTIAL HYPERTENSION: ICD-10-CM

## 2025-08-22 RX ORDER — AMLODIPINE BESYLATE 5 MG/1
5 TABLET ORAL DAILY
Qty: 90 TABLET | Refills: 0 | Status: SHIPPED | OUTPATIENT
Start: 2025-08-22

## 2025-08-22 RX ORDER — ATORVASTATIN CALCIUM 20 MG/1
20 TABLET, FILM COATED ORAL DAILY
Qty: 90 TABLET | Refills: 0 | Status: SHIPPED | OUTPATIENT
Start: 2025-08-22

## 2025-08-25 ENCOUNTER — COMMUNITY OUTREACH (OUTPATIENT)
Age: 55
End: 2025-08-25